# Patient Record
Sex: FEMALE | Race: WHITE
[De-identification: names, ages, dates, MRNs, and addresses within clinical notes are randomized per-mention and may not be internally consistent; named-entity substitution may affect disease eponyms.]

---

## 2018-01-18 NOTE — MMO
SCREENING MAMMOGRAM:

 

COMPARISON: 

Reference is made to prior mammograms from March 2015 and March 2016.

 

Examination is interpreted with the assistance of CAD.

 

FINDINGS: 

There is heterogeneously dense breast parenchyma bilaterally which limits sensitivity of mammography 
and could obscure underlying pathology.  There are benign-appearing calcifications involving each su
ast.  Stable focal asymmetry of the upper outer left breast is present.  No significant interval sparks
ge. 

 

IMPRESSION: 

BI-RADS 2 - benign findings.

 

Routine annual screening mammography is recommended. 

 

 

POS: [facility]

## 2019-03-25 NOTE — MMO
Bilateral MAMMO Bilat Screen [facility].

 

CLINICAL HISTORY:

Patient is 64 years old and is seen for screening. The patient has no family

history of breast cancer.  The patient has no personal history of cancer.

 

VIEWS:

The views performed were:  bilateral craniocaudal and bilateral mediolateral

oblique.

 

FILMS COMPARED:

The present examination has been compared to prior imaging studies performed at

[facility] on 03/20/2015, 03/22/2016 and 01/08/2018.

 

This study has been interpreted with the assistance of computer-aided detection.

 

MAMMOGRAM FINDINGS:

There are scattered fibroglandular densities.

 

Finding 1:  There are multiple stable focal asymmetries seen in both breasts.

 

Finding 2:  There are stable benign appearing calcifications seen in both

breasts.

 

IMPRESSION:

ALL ABOVE FINDINGS ARE BENIGN.

 

A ROUTINE FOLLOW-UP MAMMOGRAM IN 1 YEAR IS RECOMMENDED.

 

ACR BI-RADS Category 2 - Benign finding

 

MAMMOGRAPHY NOTE:

 1. A negative mammogram report should not delay a biopsy if a dominant of

 clinically suspicious mass is present.

 2. Approximately 10% to 15% of breast cancers are not detected by

 mammography.

 3. Adenosis and dense breasts may obscure an underlying neoplasm.

## 2022-01-10 ENCOUNTER — HOSPITAL ENCOUNTER (OUTPATIENT)
Dept: HOSPITAL 92 - CSHMAMMO | Age: 67
Discharge: HOME | End: 2022-01-10
Attending: FAMILY MEDICINE
Payer: COMMERCIAL

## 2022-01-10 DIAGNOSIS — M85.851: ICD-10-CM

## 2022-01-10 DIAGNOSIS — N95.9: ICD-10-CM

## 2022-01-10 DIAGNOSIS — Z13.820: Primary | ICD-10-CM

## 2022-01-10 PROCEDURE — 77080 DXA BONE DENSITY AXIAL: CPT

## 2022-09-21 ENCOUNTER — HOSPITAL ENCOUNTER (OUTPATIENT)
Dept: HOSPITAL 92 - SLEEPLAB | Age: 67
Discharge: HOME | End: 2022-09-21
Attending: FAMILY MEDICINE
Payer: COMMERCIAL

## 2022-09-21 DIAGNOSIS — F32.9: ICD-10-CM

## 2022-09-21 DIAGNOSIS — I10: ICD-10-CM

## 2022-09-21 DIAGNOSIS — F41.9: ICD-10-CM

## 2022-09-21 DIAGNOSIS — R06.83: ICD-10-CM

## 2022-09-21 DIAGNOSIS — G47.33: Primary | ICD-10-CM

## 2022-09-21 DIAGNOSIS — E66.9: ICD-10-CM

## 2022-09-21 DIAGNOSIS — G47.10: ICD-10-CM

## 2022-09-21 PROCEDURE — 95811 POLYSOM 6/>YRS CPAP 4/> PARM: CPT
